# Patient Record
Sex: FEMALE | Race: WHITE | NOT HISPANIC OR LATINO | Employment: OTHER | URBAN - METROPOLITAN AREA
[De-identification: names, ages, dates, MRNs, and addresses within clinical notes are randomized per-mention and may not be internally consistent; named-entity substitution may affect disease eponyms.]

---

## 2017-04-20 ENCOUNTER — NEW REFERRAL (OUTPATIENT)
Dept: URBAN - METROPOLITAN AREA CLINIC 26 | Facility: CLINIC | Age: 82
End: 2017-04-20

## 2017-04-20 VITALS
HEART RATE: 70 BPM | HEIGHT: 67 IN | SYSTOLIC BLOOD PRESSURE: 141 MMHG | WEIGHT: 160 LBS | BODY MASS INDEX: 25.11 KG/M2 | DIASTOLIC BLOOD PRESSURE: 75 MMHG

## 2017-04-20 DIAGNOSIS — H02.833: ICD-10-CM

## 2017-04-20 DIAGNOSIS — H35.3132: ICD-10-CM

## 2017-04-20 DIAGNOSIS — H40.023: ICD-10-CM

## 2017-04-20 DIAGNOSIS — H35.371: ICD-10-CM

## 2017-04-20 DIAGNOSIS — H43.813: ICD-10-CM

## 2017-04-20 DIAGNOSIS — H02.836: ICD-10-CM

## 2017-04-20 DIAGNOSIS — H04.123: ICD-10-CM

## 2017-04-20 PROCEDURE — 99204 OFFICE O/P NEW MOD 45 MIN: CPT

## 2017-04-20 PROCEDURE — 92225 OPHTHALMOSCOPY (INITIAL): CPT

## 2017-04-20 PROCEDURE — 92134 CPTRZ OPH DX IMG PST SGM RTA: CPT

## 2017-04-20 ASSESSMENT — VISUAL ACUITY
OS_PH: 20/50+2
OD_PH: 20/40
OD_SC: 20/100-1
OS_SC: 20/50-1

## 2017-04-20 ASSESSMENT — TONOMETRY
OS_IOP_MMHG: 15
OD_IOP_MMHG: 12

## 2017-05-18 ENCOUNTER — FOLLOW UP (OUTPATIENT)
Dept: URBAN - METROPOLITAN AREA CLINIC 26 | Facility: CLINIC | Age: 82
End: 2017-05-18

## 2017-05-18 VITALS — DIASTOLIC BLOOD PRESSURE: 62 MMHG | SYSTOLIC BLOOD PRESSURE: 122 MMHG | HEIGHT: 55 IN | HEART RATE: 60 BPM

## 2017-05-18 DIAGNOSIS — H35.3132: ICD-10-CM

## 2017-05-18 DIAGNOSIS — H35.371: ICD-10-CM

## 2017-05-18 DIAGNOSIS — H40.023: ICD-10-CM

## 2017-05-18 DIAGNOSIS — H43.813: ICD-10-CM

## 2017-05-18 PROCEDURE — 92235 FLUORESCEIN ANGRPH MLTIFRAME: CPT

## 2017-05-18 PROCEDURE — 92250 FUNDUS PHOTOGRAPHY W/I&R: CPT

## 2017-05-18 PROCEDURE — 92014 COMPRE OPH EXAM EST PT 1/>: CPT

## 2017-05-18 ASSESSMENT — VISUAL ACUITY
OS_PH: 20/30
OS_CC: 20/40+2
OD_CC: 20/30-2

## 2017-05-18 ASSESSMENT — TONOMETRY
OD_IOP_MMHG: 11
OS_IOP_MMHG: 11

## 2017-12-26 ENCOUNTER — APPOINTMENT (RX ONLY)
Dept: URBAN - METROPOLITAN AREA CLINIC 116 | Facility: CLINIC | Age: 82
Setting detail: DERMATOLOGY
End: 2017-12-26

## 2017-12-26 DIAGNOSIS — L82.1 OTHER SEBORRHEIC KERATOSIS: ICD-10-CM

## 2017-12-26 DIAGNOSIS — L72.0 EPIDERMAL CYST: ICD-10-CM

## 2017-12-26 DIAGNOSIS — D485 NEOPLASM OF UNCERTAIN BEHAVIOR OF SKIN: ICD-10-CM

## 2017-12-26 DIAGNOSIS — D69.2 OTHER NONTHROMBOCYTOPENIC PURPURA: ICD-10-CM

## 2017-12-26 DIAGNOSIS — L81.4 OTHER MELANIN HYPERPIGMENTATION: ICD-10-CM

## 2017-12-26 DIAGNOSIS — D18.0 HEMANGIOMA: ICD-10-CM

## 2017-12-26 PROBLEM — Z85.3 PERSONAL HISTORY OF MALIGNANT NEOPLASM OF BREAST: Status: ACTIVE | Noted: 2017-12-26

## 2017-12-26 PROBLEM — D48.5 NEOPLASM OF UNCERTAIN BEHAVIOR OF SKIN: Status: ACTIVE | Noted: 2017-12-26

## 2017-12-26 PROBLEM — I63.50 CEREBRAL INFARCTION DUE TO UNSPECIFIED OCCLUSION OR STENOSIS OF UNSPECIFIED CEREBRAL ARTERY: Status: ACTIVE | Noted: 2017-12-26

## 2017-12-26 PROBLEM — M12.9 ARTHROPATHY, UNSPECIFIED: Status: ACTIVE | Noted: 2017-12-26

## 2017-12-26 PROBLEM — D18.01 HEMANGIOMA OF SKIN AND SUBCUTANEOUS TISSUE: Status: ACTIVE | Noted: 2017-12-26

## 2017-12-26 PROBLEM — J44.9 CHRONIC OBSTRUCTIVE PULMONARY DISEASE, UNSPECIFIED: Status: ACTIVE | Noted: 2017-12-26

## 2017-12-26 PROCEDURE — ? COUNSELING

## 2017-12-26 PROCEDURE — 11100: CPT

## 2017-12-26 PROCEDURE — 11101: CPT

## 2017-12-26 PROCEDURE — 99243 OFF/OP CNSLTJ NEW/EST LOW 30: CPT | Mod: 25

## 2017-12-26 PROCEDURE — ? BIOPSY BY SHAVE METHOD

## 2017-12-26 ASSESSMENT — LOCATION SIMPLE DESCRIPTION DERM
LOCATION SIMPLE: LEFT UPPER BACK
LOCATION SIMPLE: RIGHT CHEEK
LOCATION SIMPLE: LEFT PRETIBIAL REGION
LOCATION SIMPLE: ABDOMEN
LOCATION SIMPLE: LEFT THIGH
LOCATION SIMPLE: ABDOMEN
LOCATION SIMPLE: RIGHT CALF
LOCATION SIMPLE: RIGHT UPPER BACK
LOCATION SIMPLE: RIGHT PRETIBIAL REGION
LOCATION SIMPLE: LEFT FOREARM
LOCATION SIMPLE: RIGHT LOWER BACK
LOCATION SIMPLE: RIGHT INFERIOR EYELID

## 2017-12-26 ASSESSMENT — LOCATION ZONE DERM
LOCATION ZONE: EYELID
LOCATION ZONE: ARM
LOCATION ZONE: TRUNK
LOCATION ZONE: LEG
LOCATION ZONE: TRUNK
LOCATION ZONE: FACE

## 2017-12-26 ASSESSMENT — LOCATION DETAILED DESCRIPTION DERM
LOCATION DETAILED: RIGHT INFERIOR CENTRAL MALAR CHEEK
LOCATION DETAILED: PERIUMBILICAL SKIN
LOCATION DETAILED: LEFT DISTAL PRETIBIAL REGION
LOCATION DETAILED: PERIUMBILICAL SKIN
LOCATION DETAILED: RIGHT SUPERIOR UPPER BACK
LOCATION DETAILED: LEFT PROXIMAL DORSAL FOREARM
LOCATION DETAILED: RIGHT SUPERIOR MEDIAL MIDBACK
LOCATION DETAILED: RIGHT PROXIMAL CALF
LOCATION DETAILED: LEFT SUPERIOR UPPER BACK
LOCATION DETAILED: RIGHT DISTAL PRETIBIAL REGION
LOCATION DETAILED: RIGHT CENTRAL MALAR CHEEK
LOCATION DETAILED: LEFT ANTERIOR PROXIMAL THIGH
LOCATION DETAILED: LEFT PROXIMAL PRETIBIAL REGION
LOCATION DETAILED: RIGHT LATERAL INFERIOR EYELID

## 2017-12-26 NOTE — PROCEDURE: BIOPSY BY SHAVE METHOD
Size Of Lesion In Cm: 0.8
Electrodesiccation And Curettage Text: The wound bed was treated with electrodesiccation and curettage after the biopsy was performed.
Post-Care Instructions: I reviewed with the patient in detail post-care instructions. Patient is to keep the biopsy site dry overnight, and then apply bacitracin twice daily until healed. Patient may apply hydrogen peroxide soaks to remove any crusting.
Render Post-Care Instructions In Note?: no
Dressing: bandage
X Size Of Lesion In Cm: 0
Notification Instructions: Patient will be notified of biopsy results. However, patient instructed to call the office if not contacted within 2 weeks.
Lab Facility: [id number]
Cryotherapy Text: The wound bed was treated with cryotherapy after the biopsy was performed.
Anesthesia Volume In Cc (Will Not Render If 0): 0.5
Type Of Destruction Used: Curettage
Anesthesia Type: 2% Xylocaine with epinephrine and sodium bicarbonate
Silver Nitrate Text: The wound bed was treated with silver nitrate after the biopsy was performed.
Electrodesiccation Text: The wound bed was treated with electrodesiccation after the biopsy was performed.
Hemostasis: Aluminum Chloride
Biopsy Method: Double edge Personna blades
Detail Level: Simple
Lab: [facility]
Consent: Written consent was obtained and risks were reviewed including but not limited to scarring, infection, bleeding, scabbing, incomplete removal, nerve damage and allergy to anesthesia.
Billing Type: [employer]
Biopsy Type: H and E
Wound Care: Polysporin ointment
Anesthesia Type: 1% lidocaine with epinephrine and a 1:10 solution of 8.4% sodium bicarbonate
Lab: [id number]
Lab Facility: [id number]
Billing Type: Third-Party Bill
Size Of Lesion In Cm: 1.7

## 2018-01-16 ENCOUNTER — APPOINTMENT (RX ONLY)
Dept: URBAN - METROPOLITAN AREA CLINIC 116 | Facility: CLINIC | Age: 83
Setting detail: DERMATOLOGY
End: 2018-01-16

## 2018-01-16 DIAGNOSIS — L57.0 ACTINIC KERATOSIS: ICD-10-CM

## 2018-01-16 PROCEDURE — ? LIQUID NITROGEN

## 2018-01-16 PROCEDURE — 17003 DESTRUCT PREMALG LES 2-14: CPT

## 2018-01-16 PROCEDURE — ? DIAGNOSIS COMMENT

## 2018-01-16 PROCEDURE — 17000 DESTRUCT PREMALG LESION: CPT

## 2018-01-16 ASSESSMENT — LOCATION DETAILED DESCRIPTION DERM
LOCATION DETAILED: LEFT DISTAL DORSAL FOREARM
LOCATION DETAILED: LEFT PROXIMAL DORSAL FOREARM

## 2018-01-16 ASSESSMENT — LOCATION ZONE DERM: LOCATION ZONE: ARM

## 2018-01-16 ASSESSMENT — LOCATION SIMPLE DESCRIPTION DERM: LOCATION SIMPLE: LEFT FOREARM

## 2018-05-17 ENCOUNTER — FOLLOW UP (OUTPATIENT)
Dept: URBAN - METROPOLITAN AREA CLINIC 26 | Facility: CLINIC | Age: 83
End: 2018-05-17

## 2018-05-17 VITALS — WEIGHT: 165 LBS | HEIGHT: 67 IN | BODY MASS INDEX: 25.9 KG/M2

## 2018-05-17 DIAGNOSIS — H35.371: ICD-10-CM

## 2018-05-17 DIAGNOSIS — H40.023: ICD-10-CM

## 2018-05-17 DIAGNOSIS — H35.3132: ICD-10-CM

## 2018-05-17 DIAGNOSIS — H04.123: ICD-10-CM

## 2018-05-17 DIAGNOSIS — H02.833: ICD-10-CM

## 2018-05-17 DIAGNOSIS — H43.813: ICD-10-CM

## 2018-05-17 DIAGNOSIS — H02.836: ICD-10-CM

## 2018-05-17 PROCEDURE — 92250 FUNDUS PHOTOGRAPHY W/I&R: CPT

## 2018-05-17 PROCEDURE — 92134 CPTRZ OPH DX IMG PST SGM RTA: CPT

## 2018-05-17 PROCEDURE — 92014 COMPRE OPH EXAM EST PT 1/>: CPT

## 2018-05-17 ASSESSMENT — VISUAL ACUITY
OS_CC: 20/25-2
OD_CC: 20/30-2

## 2018-05-17 ASSESSMENT — TONOMETRY
OS_IOP_MMHG: 11
OD_IOP_MMHG: 15

## 2019-05-01 ENCOUNTER — FOLLOW UP (OUTPATIENT)
Dept: URBAN - METROPOLITAN AREA CLINIC 26 | Facility: CLINIC | Age: 84
End: 2019-05-01

## 2019-05-01 VITALS
BODY MASS INDEX: 26.52 KG/M2 | WEIGHT: 165 LBS | HEIGHT: 66 IN | DIASTOLIC BLOOD PRESSURE: 64 MMHG | HEART RATE: 70 BPM | SYSTOLIC BLOOD PRESSURE: 129 MMHG

## 2019-05-01 DIAGNOSIS — H35.371: ICD-10-CM

## 2019-05-01 DIAGNOSIS — H35.3112: ICD-10-CM

## 2019-05-01 DIAGNOSIS — H02.836: ICD-10-CM

## 2019-05-01 DIAGNOSIS — H35.3221: ICD-10-CM

## 2019-05-01 DIAGNOSIS — H04.123: ICD-10-CM

## 2019-05-01 DIAGNOSIS — H43.813: ICD-10-CM

## 2019-05-01 DIAGNOSIS — H40.023: ICD-10-CM

## 2019-05-01 DIAGNOSIS — H02.833: ICD-10-CM

## 2019-05-01 PROCEDURE — 92014 COMPRE OPH EXAM EST PT 1/>: CPT

## 2019-05-01 PROCEDURE — 67028 INJECTION EYE DRUG: CPT

## 2019-05-01 PROCEDURE — 92250 FUNDUS PHOTOGRAPHY W/I&R: CPT

## 2019-05-01 PROCEDURE — 92235 FLUORESCEIN ANGRPH MLTIFRAME: CPT

## 2019-05-01 ASSESSMENT — TONOMETRY
OD_IOP_MMHG: 11
OS_IOP_MMHG: 13

## 2019-05-01 ASSESSMENT — VISUAL ACUITY
OD_SC: 20/30
OS_SC: 20/50
OS_PH: 20/30+1

## 2019-06-05 ENCOUNTER — CLINICAL PROCEDURE AND DIAGNOSTIC TESTING ONLY (OUTPATIENT)
Dept: URBAN - METROPOLITAN AREA CLINIC 26 | Facility: CLINIC | Age: 84
End: 2019-06-05

## 2019-06-05 DIAGNOSIS — H35.3221: ICD-10-CM

## 2019-06-05 DIAGNOSIS — H35.3112: ICD-10-CM

## 2019-06-05 PROCEDURE — 92250 FUNDUS PHOTOGRAPHY W/I&R: CPT

## 2019-06-05 PROCEDURE — 67028 INJECTION EYE DRUG: CPT

## 2019-06-05 ASSESSMENT — TONOMETRY: OS_IOP_MMHG: 12

## 2019-06-05 ASSESSMENT — VISUAL ACUITY: OS_SC: 20/50-2

## 2019-07-09 ENCOUNTER — CLINICAL PROCEDURE AND DIAGNOSTIC TESTING ONLY (OUTPATIENT)
Dept: URBAN - METROPOLITAN AREA CLINIC 26 | Facility: CLINIC | Age: 84
End: 2019-07-09

## 2019-07-09 DIAGNOSIS — H35.3221: ICD-10-CM

## 2019-07-09 DIAGNOSIS — H35.3112: ICD-10-CM

## 2019-07-09 PROCEDURE — 67028 INJECTION EYE DRUG: CPT

## 2019-07-09 PROCEDURE — 92134 CPTRZ OPH DX IMG PST SGM RTA: CPT

## 2019-07-09 ASSESSMENT — VISUAL ACUITY: OS_SC: 20/60+1

## 2019-07-09 ASSESSMENT — TONOMETRY: OS_IOP_MMHG: 9

## 2019-08-13 ENCOUNTER — FOLLOW UP AND POST INJECTION EVALUATION (OUTPATIENT)
Dept: URBAN - METROPOLITAN AREA CLINIC 26 | Facility: CLINIC | Age: 84
End: 2019-08-13

## 2019-08-13 DIAGNOSIS — H35.371: ICD-10-CM

## 2019-08-13 DIAGNOSIS — H43.813: ICD-10-CM

## 2019-08-13 DIAGNOSIS — H35.3112: ICD-10-CM

## 2019-08-13 DIAGNOSIS — H35.3221: ICD-10-CM

## 2019-08-13 DIAGNOSIS — H40.023: ICD-10-CM

## 2019-08-13 PROCEDURE — 67028 INJECTION EYE DRUG: CPT

## 2019-08-13 PROCEDURE — 92250 FUNDUS PHOTOGRAPHY W/I&R: CPT

## 2019-08-13 PROCEDURE — 92014 COMPRE OPH EXAM EST PT 1/>: CPT

## 2019-08-13 PROCEDURE — 92134 CPTRZ OPH DX IMG PST SGM RTA: CPT

## 2019-08-13 ASSESSMENT — VISUAL ACUITY
OD_CC: 20/25-2
OS_CC: 20/30+2

## 2019-08-13 ASSESSMENT — TONOMETRY
OS_IOP_MMHG: 13
OD_IOP_MMHG: 12

## 2019-09-24 ENCOUNTER — CLINICAL PROCEDURE AND DIAGNOSTIC TESTING ONLY (OUTPATIENT)
Dept: URBAN - METROPOLITAN AREA CLINIC 26 | Facility: CLINIC | Age: 84
End: 2019-09-24

## 2019-09-24 DIAGNOSIS — H35.3221: ICD-10-CM

## 2019-09-24 DIAGNOSIS — H35.3112: ICD-10-CM

## 2019-09-24 PROCEDURE — 67028 INJECTION EYE DRUG: CPT

## 2019-09-24 PROCEDURE — 92134 CPTRZ OPH DX IMG PST SGM RTA: CPT

## 2019-09-24 PROCEDURE — 92250 FUNDUS PHOTOGRAPHY W/I&R: CPT

## 2019-09-24 ASSESSMENT — TONOMETRY: OS_IOP_MMHG: 14

## 2019-09-24 ASSESSMENT — VISUAL ACUITY: OS_SC: 20/60-2

## 2019-11-12 ENCOUNTER — CLINICAL PROCEDURE AND DIAGNOSTIC TESTING ONLY (OUTPATIENT)
Dept: URBAN - METROPOLITAN AREA CLINIC 26 | Facility: CLINIC | Age: 84
End: 2019-11-12

## 2019-11-12 DIAGNOSIS — H35.3221: ICD-10-CM

## 2019-11-12 DIAGNOSIS — H35.3112: ICD-10-CM

## 2019-11-12 PROCEDURE — 67028 INJECTION EYE DRUG: CPT

## 2019-11-12 PROCEDURE — 92134 CPTRZ OPH DX IMG PST SGM RTA: CPT

## 2019-11-12 PROCEDURE — 92250 FUNDUS PHOTOGRAPHY W/I&R: CPT

## 2019-11-12 ASSESSMENT — TONOMETRY: OS_IOP_MMHG: 13

## 2019-11-12 ASSESSMENT — VISUAL ACUITY: OS_CC: 20/30-1

## 2020-01-07 ENCOUNTER — FOLLOW UP AND POST INJECTION EVALUATION (OUTPATIENT)
Dept: URBAN - METROPOLITAN AREA CLINIC 26 | Facility: CLINIC | Age: 85
End: 2020-01-07

## 2020-01-07 VITALS — BODY MASS INDEX: 26.52 KG/M2 | WEIGHT: 165 LBS | HEIGHT: 66 IN

## 2020-01-07 DIAGNOSIS — H35.3112: ICD-10-CM

## 2020-01-07 DIAGNOSIS — H02.833: ICD-10-CM

## 2020-01-07 DIAGNOSIS — H35.371: ICD-10-CM

## 2020-01-07 DIAGNOSIS — H35.3221: ICD-10-CM

## 2020-01-07 DIAGNOSIS — H43.813: ICD-10-CM

## 2020-01-07 DIAGNOSIS — H04.123: ICD-10-CM

## 2020-01-07 DIAGNOSIS — H40.023: ICD-10-CM

## 2020-01-07 DIAGNOSIS — H02.836: ICD-10-CM

## 2020-01-07 PROCEDURE — 67028 INJECTION EYE DRUG: CPT

## 2020-01-07 PROCEDURE — 92250 FUNDUS PHOTOGRAPHY W/I&R: CPT

## 2020-01-07 PROCEDURE — 92014 COMPRE OPH EXAM EST PT 1/>: CPT

## 2020-01-07 ASSESSMENT — TONOMETRY
OD_IOP_MMHG: 14
OS_IOP_MMHG: 14

## 2020-01-07 ASSESSMENT — VISUAL ACUITY
OD_CC: 20/20-2
OS_CC: 20/25+2

## 2020-03-10 ENCOUNTER — CLINICAL PROCEDURE AND DIAGNOSTIC TESTING ONLY (OUTPATIENT)
Dept: URBAN - METROPOLITAN AREA CLINIC 26 | Facility: CLINIC | Age: 85
End: 2020-03-10

## 2020-03-10 DIAGNOSIS — H35.3112: ICD-10-CM

## 2020-03-10 DIAGNOSIS — H35.3221: ICD-10-CM

## 2020-03-10 PROCEDURE — 67028 INJECTION EYE DRUG: CPT

## 2020-03-10 PROCEDURE — 92134 CPTRZ OPH DX IMG PST SGM RTA: CPT

## 2020-03-10 PROCEDURE — 92250 FUNDUS PHOTOGRAPHY W/I&R: CPT

## 2020-03-10 ASSESSMENT — TONOMETRY: OS_IOP_MMHG: 15

## 2020-03-10 ASSESSMENT — VISUAL ACUITY
OS_CC: 20/30+1
OD_CC: 20/25+1

## 2020-05-19 ENCOUNTER — CLINICAL PROCEDURE AND DIAGNOSTIC TESTING ONLY (OUTPATIENT)
Dept: URBAN - METROPOLITAN AREA CLINIC 26 | Facility: CLINIC | Age: 85
End: 2020-05-19

## 2020-05-19 DIAGNOSIS — H35.3112: ICD-10-CM

## 2020-05-19 DIAGNOSIS — H35.371: ICD-10-CM

## 2020-05-19 DIAGNOSIS — H35.3221: ICD-10-CM

## 2020-05-19 PROCEDURE — 67028 INJECTION EYE DRUG: CPT

## 2020-05-19 PROCEDURE — 92134 CPTRZ OPH DX IMG PST SGM RTA: CPT

## 2020-05-19 PROCEDURE — 92250 FUNDUS PHOTOGRAPHY W/I&R: CPT

## 2020-05-19 ASSESSMENT — TONOMETRY: OS_IOP_MMHG: 14

## 2020-05-19 ASSESSMENT — VISUAL ACUITY: OS_CC: 20/25

## 2020-07-28 ENCOUNTER — FOLLOW UP AND POST INJECTION EVALUATION (OUTPATIENT)
Dept: URBAN - METROPOLITAN AREA CLINIC 26 | Facility: CLINIC | Age: 85
End: 2020-07-28

## 2020-07-28 DIAGNOSIS — H35.371: ICD-10-CM

## 2020-07-28 DIAGNOSIS — H40.023: ICD-10-CM

## 2020-07-28 DIAGNOSIS — H35.3112: ICD-10-CM

## 2020-07-28 DIAGNOSIS — H35.3221: ICD-10-CM

## 2020-07-28 DIAGNOSIS — H43.813: ICD-10-CM

## 2020-07-28 PROCEDURE — 92134 CPTRZ OPH DX IMG PST SGM RTA: CPT

## 2020-07-28 PROCEDURE — 92014 COMPRE OPH EXAM EST PT 1/>: CPT

## 2020-07-28 PROCEDURE — 67028 INJECTION EYE DRUG: CPT

## 2020-07-28 PROCEDURE — 92250 FUNDUS PHOTOGRAPHY W/I&R: CPT

## 2020-07-28 ASSESSMENT — TONOMETRY
OS_IOP_MMHG: 13
OD_IOP_MMHG: 13

## 2020-07-28 ASSESSMENT — VISUAL ACUITY
OS_CC: 20/25-1
OD_CC: 20/30-1

## 2020-09-22 ENCOUNTER — CLINICAL PROCEDURE AND DIAGNOSTIC TESTING ONLY (OUTPATIENT)
Dept: URBAN - METROPOLITAN AREA CLINIC 26 | Facility: CLINIC | Age: 85
End: 2020-09-22

## 2020-09-22 DIAGNOSIS — H35.3112: ICD-10-CM

## 2020-09-22 DIAGNOSIS — H35.3221: ICD-10-CM

## 2020-09-22 PROCEDURE — 92250 FUNDUS PHOTOGRAPHY W/I&R: CPT

## 2020-09-22 PROCEDURE — 67028 INJECTION EYE DRUG: CPT

## 2020-09-22 ASSESSMENT — TONOMETRY: OS_IOP_MMHG: 14

## 2020-09-22 ASSESSMENT — VISUAL ACUITY: OS_CC: 20/30-2

## 2020-11-23 ENCOUNTER — CLINICAL PROCEDURE AND DIAGNOSTIC TESTING ONLY (OUTPATIENT)
Dept: URBAN - METROPOLITAN AREA CLINIC 26 | Facility: CLINIC | Age: 85
End: 2020-11-23

## 2020-11-23 DIAGNOSIS — H35.3112: ICD-10-CM

## 2020-11-23 DIAGNOSIS — H35.3221: ICD-10-CM

## 2020-11-23 PROCEDURE — 92134 CPTRZ OPH DX IMG PST SGM RTA: CPT

## 2020-11-23 PROCEDURE — 92250 FUNDUS PHOTOGRAPHY W/I&R: CPT

## 2020-11-23 PROCEDURE — 67028 INJECTION EYE DRUG: CPT

## 2020-11-23 RX ORDER — LATANOPROST 0.05 MG/ML: 1 SOLUTION/ DROPS OPHTHALMIC; TOPICAL EVERY EVENING

## 2020-11-23 ASSESSMENT — VISUAL ACUITY: OS_CC: 20/30-1

## 2020-11-23 ASSESSMENT — TONOMETRY: OS_IOP_MMHG: 13

## 2021-01-19 ENCOUNTER — FOLLOW UP AND POST INJECTION EVALUATION (OUTPATIENT)
Dept: URBAN - METROPOLITAN AREA CLINIC 26 | Facility: CLINIC | Age: 86
End: 2021-01-19

## 2021-01-19 VITALS — BODY MASS INDEX: 26.52 KG/M2 | WEIGHT: 165 LBS | HEIGHT: 66 IN

## 2021-01-19 DIAGNOSIS — H40.023: ICD-10-CM

## 2021-01-19 DIAGNOSIS — H35.3112: ICD-10-CM

## 2021-01-19 DIAGNOSIS — H35.371: ICD-10-CM

## 2021-01-19 DIAGNOSIS — H43.813: ICD-10-CM

## 2021-01-19 DIAGNOSIS — H35.3221: ICD-10-CM

## 2021-01-19 PROCEDURE — 67028 INJECTION EYE DRUG: CPT

## 2021-01-19 PROCEDURE — 92134 CPTRZ OPH DX IMG PST SGM RTA: CPT

## 2021-01-19 PROCEDURE — 92014 COMPRE OPH EXAM EST PT 1/>: CPT

## 2021-01-19 PROCEDURE — 92250 FUNDUS PHOTOGRAPHY W/I&R: CPT

## 2021-01-19 ASSESSMENT — VISUAL ACUITY
OD_PH: 20/30-2
OD_CC: 20/40-2
OS_CC: 20/30-2

## 2021-01-19 ASSESSMENT — TONOMETRY
OS_IOP_MMHG: 10
OD_IOP_MMHG: 11

## 2021-03-16 ENCOUNTER — CLINICAL PROCEDURE AND DIAGNOSTIC TESTING ONLY (OUTPATIENT)
Dept: URBAN - METROPOLITAN AREA CLINIC 26 | Facility: CLINIC | Age: 86
End: 2021-03-16

## 2021-03-16 DIAGNOSIS — H35.3112: ICD-10-CM

## 2021-03-16 DIAGNOSIS — H35.3221: ICD-10-CM

## 2021-03-16 DIAGNOSIS — H35.371: ICD-10-CM

## 2021-03-16 PROCEDURE — 67028 INJECTION EYE DRUG: CPT

## 2021-03-16 PROCEDURE — 92250 FUNDUS PHOTOGRAPHY W/I&R: CPT

## 2021-03-16 PROCEDURE — 92134 CPTRZ OPH DX IMG PST SGM RTA: CPT

## 2021-03-16 ASSESSMENT — VISUAL ACUITY: OS_CC: 20/30-1

## 2021-03-16 ASSESSMENT — TONOMETRY: OS_IOP_MMHG: 11

## 2021-05-11 ENCOUNTER — CLINICAL PROCEDURE AND DIAGNOSTIC TESTING ONLY (OUTPATIENT)
Dept: URBAN - METROPOLITAN AREA CLINIC 26 | Facility: CLINIC | Age: 86
End: 2021-05-11

## 2021-05-11 DIAGNOSIS — H35.3221: ICD-10-CM

## 2021-05-11 DIAGNOSIS — H35.371: ICD-10-CM

## 2021-05-11 DIAGNOSIS — H35.3112: ICD-10-CM

## 2021-05-11 PROCEDURE — 92134 CPTRZ OPH DX IMG PST SGM RTA: CPT

## 2021-05-11 PROCEDURE — 67028 INJECTION EYE DRUG: CPT

## 2021-05-11 PROCEDURE — 92250 FUNDUS PHOTOGRAPHY W/I&R: CPT

## 2021-05-11 ASSESSMENT — TONOMETRY: OS_IOP_MMHG: 15

## 2021-05-11 ASSESSMENT — VISUAL ACUITY: OS_CC: 20/30

## 2021-07-20 ENCOUNTER — FOLLOW UP (OUTPATIENT)
Dept: URBAN - METROPOLITAN AREA CLINIC 26 | Facility: CLINIC | Age: 86
End: 2021-07-20

## 2021-07-20 DIAGNOSIS — H35.3112: ICD-10-CM

## 2021-07-20 DIAGNOSIS — H35.371: ICD-10-CM

## 2021-07-20 DIAGNOSIS — H40.023: ICD-10-CM

## 2021-07-20 DIAGNOSIS — H43.813: ICD-10-CM

## 2021-07-20 DIAGNOSIS — H35.3221: ICD-10-CM

## 2021-07-20 PROCEDURE — 92250 FUNDUS PHOTOGRAPHY W/I&R: CPT

## 2021-07-20 PROCEDURE — 67028 INJECTION EYE DRUG: CPT

## 2021-07-20 PROCEDURE — 92014 COMPRE OPH EXAM EST PT 1/>: CPT

## 2021-07-20 PROCEDURE — 92134 CPTRZ OPH DX IMG PST SGM RTA: CPT

## 2021-07-20 ASSESSMENT — VISUAL ACUITY
OD_SC: 20/25-2
OS_SC: 20/25-2

## 2021-07-20 ASSESSMENT — TONOMETRY
OD_IOP_MMHG: 11
OS_IOP_MMHG: 10

## 2021-09-14 ENCOUNTER — CLINICAL PROCEDURE AND DIAGNOSTIC TESTING ONLY (OUTPATIENT)
Dept: URBAN - METROPOLITAN AREA CLINIC 26 | Facility: CLINIC | Age: 86
End: 2021-09-14

## 2021-09-14 DIAGNOSIS — H35.3221: ICD-10-CM

## 2021-09-14 DIAGNOSIS — H35.371: ICD-10-CM

## 2021-09-14 DIAGNOSIS — H35.3112: ICD-10-CM

## 2021-09-14 PROCEDURE — 92250 FUNDUS PHOTOGRAPHY W/I&R: CPT

## 2021-09-14 PROCEDURE — 92134 CPTRZ OPH DX IMG PST SGM RTA: CPT

## 2021-09-14 PROCEDURE — 67028 INJECTION EYE DRUG: CPT

## 2021-09-14 ASSESSMENT — VISUAL ACUITY: OS_CC: 20/30-2

## 2021-09-14 ASSESSMENT — TONOMETRY: OS_IOP_MMHG: 13

## 2021-11-09 ENCOUNTER — CLINICAL PROCEDURE AND DIAGNOSTIC TESTING ONLY (OUTPATIENT)
Dept: URBAN - METROPOLITAN AREA CLINIC 26 | Facility: CLINIC | Age: 86
End: 2021-11-09

## 2021-11-09 DIAGNOSIS — H35.3112: ICD-10-CM

## 2021-11-09 DIAGNOSIS — H35.371: ICD-10-CM

## 2021-11-09 DIAGNOSIS — H35.3221: ICD-10-CM

## 2021-11-09 PROCEDURE — 92134 CPTRZ OPH DX IMG PST SGM RTA: CPT

## 2021-11-09 PROCEDURE — 67028 INJECTION EYE DRUG: CPT

## 2021-11-09 PROCEDURE — 92250 FUNDUS PHOTOGRAPHY W/I&R: CPT

## 2021-11-09 ASSESSMENT — TONOMETRY: OS_IOP_MMHG: 09

## 2021-11-09 ASSESSMENT — VISUAL ACUITY: OS_CC: 20/30+1

## 2022-01-04 ENCOUNTER — FOLLOW UP (OUTPATIENT)
Dept: URBAN - METROPOLITAN AREA CLINIC 26 | Facility: CLINIC | Age: 87
End: 2022-01-04

## 2022-01-04 VITALS — BODY MASS INDEX: 29.44 KG/M2 | HEIGHT: 62 IN | WEIGHT: 160 LBS

## 2022-01-04 DIAGNOSIS — H35.3221: ICD-10-CM

## 2022-01-04 DIAGNOSIS — H35.3112: ICD-10-CM

## 2022-01-04 DIAGNOSIS — H43.813: ICD-10-CM

## 2022-01-04 DIAGNOSIS — H04.123: ICD-10-CM

## 2022-01-04 DIAGNOSIS — H35.371: ICD-10-CM

## 2022-01-04 DIAGNOSIS — H40.023: ICD-10-CM

## 2022-01-04 PROCEDURE — 92250 FUNDUS PHOTOGRAPHY W/I&R: CPT

## 2022-01-04 PROCEDURE — 92134 CPTRZ OPH DX IMG PST SGM RTA: CPT

## 2022-01-04 PROCEDURE — 67028 INJECTION EYE DRUG: CPT

## 2022-01-04 PROCEDURE — 92014 COMPRE OPH EXAM EST PT 1/>: CPT

## 2022-01-04 ASSESSMENT — TONOMETRY
OS_IOP_MMHG: 12
OD_IOP_MMHG: 13

## 2022-01-04 ASSESSMENT — VISUAL ACUITY
OD_CC: 20/30+2
OS_CC: 20/25-1

## 2022-03-01 ENCOUNTER — CLINIC PROCEDURE ONLY (OUTPATIENT)
Dept: URBAN - METROPOLITAN AREA CLINIC 26 | Facility: CLINIC | Age: 87
End: 2022-03-01

## 2022-03-01 DIAGNOSIS — H35.371: ICD-10-CM

## 2022-03-01 DIAGNOSIS — H35.3112: ICD-10-CM

## 2022-03-01 DIAGNOSIS — H35.3221: ICD-10-CM

## 2022-03-01 PROCEDURE — 92134 CPTRZ OPH DX IMG PST SGM RTA: CPT

## 2022-03-01 PROCEDURE — 92250 FUNDUS PHOTOGRAPHY W/I&R: CPT

## 2022-03-01 PROCEDURE — 67028 INJECTION EYE DRUG: CPT

## 2022-03-01 ASSESSMENT — TONOMETRY: OS_IOP_MMHG: 17

## 2022-04-26 ENCOUNTER — CLINIC PROCEDURE ONLY (OUTPATIENT)
Dept: URBAN - METROPOLITAN AREA CLINIC 26 | Facility: CLINIC | Age: 87
End: 2022-04-26

## 2022-04-26 DIAGNOSIS — H35.371: ICD-10-CM

## 2022-04-26 DIAGNOSIS — H35.3112: ICD-10-CM

## 2022-04-26 DIAGNOSIS — H35.3221: ICD-10-CM

## 2022-04-26 PROCEDURE — 67028 INJECTION EYE DRUG: CPT

## 2022-04-26 PROCEDURE — 92134 CPTRZ OPH DX IMG PST SGM RTA: CPT

## 2022-04-26 PROCEDURE — 92250 FUNDUS PHOTOGRAPHY W/I&R: CPT

## 2022-04-26 ASSESSMENT — TONOMETRY
OS_IOP_MMHG: 13
OS_IOP_MMHG: 19

## 2022-06-07 ENCOUNTER — CLINIC PROCEDURE ONLY (OUTPATIENT)
Dept: URBAN - METROPOLITAN AREA CLINIC 26 | Facility: CLINIC | Age: 87
End: 2022-06-07

## 2022-06-07 DIAGNOSIS — H35.3112: ICD-10-CM

## 2022-06-07 DIAGNOSIS — H35.3221: ICD-10-CM

## 2022-06-07 DIAGNOSIS — H35.371: ICD-10-CM

## 2022-06-07 PROCEDURE — 92134 CPTRZ OPH DX IMG PST SGM RTA: CPT

## 2022-06-07 PROCEDURE — 92250 FUNDUS PHOTOGRAPHY W/I&R: CPT

## 2022-06-07 PROCEDURE — 67028 INJECTION EYE DRUG: CPT

## 2022-06-07 ASSESSMENT — TONOMETRY: OS_IOP_MMHG: 15

## 2022-07-09 ENCOUNTER — TELEPHONE ENCOUNTER (OUTPATIENT)
Dept: URBAN - METROPOLITAN AREA CLINIC 121 | Facility: CLINIC | Age: 87
End: 2022-07-09

## 2022-07-10 ENCOUNTER — TELEPHONE ENCOUNTER (OUTPATIENT)
Dept: URBAN - METROPOLITAN AREA CLINIC 121 | Facility: CLINIC | Age: 87
End: 2022-07-10

## 2022-07-10 RX ORDER — ANASTROZOLE 1 MG/1
TABLET ORAL ONCE A DAY
Refills: 0 | Status: ACTIVE | COMMUNITY
Start: 2008-11-14

## 2022-08-02 ENCOUNTER — FOLLOW UP (OUTPATIENT)
Dept: URBAN - METROPOLITAN AREA CLINIC 26 | Facility: CLINIC | Age: 87
End: 2022-08-02

## 2022-08-02 DIAGNOSIS — H43.813: ICD-10-CM

## 2022-08-02 DIAGNOSIS — H35.3221: ICD-10-CM

## 2022-08-02 DIAGNOSIS — H35.371: ICD-10-CM

## 2022-08-02 DIAGNOSIS — H35.3133: ICD-10-CM

## 2022-08-02 DIAGNOSIS — H40.023: ICD-10-CM

## 2022-08-02 DIAGNOSIS — H04.123: ICD-10-CM

## 2022-08-02 PROCEDURE — 92014 COMPRE OPH EXAM EST PT 1/>: CPT

## 2022-08-02 PROCEDURE — 92134 CPTRZ OPH DX IMG PST SGM RTA: CPT

## 2022-08-02 PROCEDURE — 92250 FUNDUS PHOTOGRAPHY W/I&R: CPT

## 2022-08-02 PROCEDURE — 67028 INJECTION EYE DRUG: CPT

## 2022-08-02 ASSESSMENT — TONOMETRY
OS_IOP_MMHG: 15
OD_IOP_MMHG: 16

## 2022-08-02 ASSESSMENT — VISUAL ACUITY
OS_SC: 20/50-2
OD_SC: 20/25
OS_PH: 20/40-1

## 2022-10-04 ENCOUNTER — CLINIC PROCEDURE ONLY (OUTPATIENT)
Dept: URBAN - METROPOLITAN AREA CLINIC 26 | Facility: CLINIC | Age: 87
End: 2022-10-04

## 2022-10-04 DIAGNOSIS — H35.3221: ICD-10-CM

## 2022-10-04 DIAGNOSIS — H35.3133: ICD-10-CM

## 2022-10-04 DIAGNOSIS — H35.371: ICD-10-CM

## 2022-10-04 PROCEDURE — 92250 FUNDUS PHOTOGRAPHY W/I&R: CPT

## 2022-10-04 PROCEDURE — 92134 CPTRZ OPH DX IMG PST SGM RTA: CPT

## 2022-10-04 PROCEDURE — 67028 INJECTION EYE DRUG: CPT

## 2022-10-04 ASSESSMENT — TONOMETRY: OS_IOP_MMHG: 13

## 2022-11-29 ENCOUNTER — CLINIC PROCEDURE ONLY (OUTPATIENT)
Dept: URBAN - METROPOLITAN AREA CLINIC 26 | Facility: CLINIC | Age: 87
End: 2022-11-29

## 2022-11-29 DIAGNOSIS — H35.3221: ICD-10-CM

## 2022-11-29 DIAGNOSIS — H35.371: ICD-10-CM

## 2022-11-29 DIAGNOSIS — H35.3133: ICD-10-CM

## 2022-11-29 PROCEDURE — 92134 CPTRZ OPH DX IMG PST SGM RTA: CPT

## 2022-11-29 PROCEDURE — 92250 FUNDUS PHOTOGRAPHY W/I&R: CPT

## 2022-11-29 PROCEDURE — 67028 INJECTION EYE DRUG: CPT

## 2022-11-29 ASSESSMENT — TONOMETRY: OS_IOP_MMHG: 12

## 2023-01-24 ENCOUNTER — CLINIC PROCEDURE ONLY (OUTPATIENT)
Dept: URBAN - METROPOLITAN AREA CLINIC 26 | Facility: CLINIC | Age: 88
End: 2023-01-24

## 2023-01-24 DIAGNOSIS — H35.371: ICD-10-CM

## 2023-01-24 DIAGNOSIS — H35.3133: ICD-10-CM

## 2023-01-24 DIAGNOSIS — H35.3221: ICD-10-CM

## 2023-01-24 PROCEDURE — 67028 INJECTION EYE DRUG: CPT

## 2023-01-24 PROCEDURE — 92134 CPTRZ OPH DX IMG PST SGM RTA: CPT

## 2023-01-24 PROCEDURE — 92250 FUNDUS PHOTOGRAPHY W/I&R: CPT

## 2023-01-24 ASSESSMENT — TONOMETRY: OS_IOP_MMHG: 11

## 2023-04-04 ENCOUNTER — CLINIC PROCEDURE ONLY (OUTPATIENT)
Dept: URBAN - METROPOLITAN AREA CLINIC 26 | Facility: CLINIC | Age: 88
End: 2023-04-04

## 2023-04-04 DIAGNOSIS — H35.3123: ICD-10-CM

## 2023-04-04 PROCEDURE — 67028 INJECTION EYE DRUG: CPT

## 2023-04-04 PROCEDURE — J3490SYF SYFOVRE

## 2023-04-04 ASSESSMENT — TONOMETRY: OS_IOP_MMHG: 13

## 2023-05-23 ENCOUNTER — CLINIC PROCEDURE ONLY (OUTPATIENT)
Dept: URBAN - METROPOLITAN AREA CLINIC 26 | Facility: CLINIC | Age: 88
End: 2023-05-23

## 2023-05-23 DIAGNOSIS — H35.371: ICD-10-CM

## 2023-05-23 DIAGNOSIS — H35.3112: ICD-10-CM

## 2023-05-23 DIAGNOSIS — H35.3221: ICD-10-CM

## 2023-05-23 PROCEDURE — 67028 INJECTION EYE DRUG: CPT

## 2023-05-23 PROCEDURE — 92134 CPTRZ OPH DX IMG PST SGM RTA: CPT

## 2023-05-23 ASSESSMENT — TONOMETRY: OS_IOP_MMHG: 16

## 2023-07-26 ENCOUNTER — CLINIC PROCEDURE ONLY (OUTPATIENT)
Dept: URBAN - METROPOLITAN AREA CLINIC 26 | Facility: CLINIC | Age: 88
End: 2023-07-26

## 2023-07-26 DIAGNOSIS — H35.3112: ICD-10-CM

## 2023-07-26 DIAGNOSIS — H35.3221: ICD-10-CM

## 2023-07-26 DIAGNOSIS — H35.371: ICD-10-CM

## 2023-07-26 PROCEDURE — 67028 INJECTION EYE DRUG: CPT

## 2023-07-26 PROCEDURE — 92134 CPTRZ OPH DX IMG PST SGM RTA: CPT

## 2023-07-26 ASSESSMENT — TONOMETRY: OS_IOP_MMHG: 11

## 2023-09-19 ENCOUNTER — FOLLOW UP (OUTPATIENT)
Dept: URBAN - METROPOLITAN AREA CLINIC 26 | Facility: CLINIC | Age: 88
End: 2023-09-19

## 2023-09-19 DIAGNOSIS — H35.3112: ICD-10-CM

## 2023-09-19 DIAGNOSIS — H35.371: ICD-10-CM

## 2023-09-19 DIAGNOSIS — H04.123: ICD-10-CM

## 2023-09-19 DIAGNOSIS — H35.3123: ICD-10-CM

## 2023-09-19 DIAGNOSIS — H35.3221: ICD-10-CM

## 2023-09-19 DIAGNOSIS — H40.023: ICD-10-CM

## 2023-09-19 DIAGNOSIS — H43.813: ICD-10-CM

## 2023-09-19 PROCEDURE — 92250 FUNDUS PHOTOGRAPHY W/I&R: CPT

## 2023-09-19 PROCEDURE — 92134 CPTRZ OPH DX IMG PST SGM RTA: CPT

## 2023-09-19 PROCEDURE — 92014 COMPRE OPH EXAM EST PT 1/>: CPT

## 2023-09-19 PROCEDURE — 67028 INJECTION EYE DRUG: CPT

## 2023-09-19 ASSESSMENT — VISUAL ACUITY
OD_PH: 20/30
OS_PH: 20/50+2
OD_SC: 20/40+2
OS_SC: 20/50-2

## 2023-09-19 ASSESSMENT — TONOMETRY
OD_IOP_MMHG: 15
OS_IOP_MMHG: 16

## 2023-10-24 ENCOUNTER — CLINIC PROCEDURE ONLY (OUTPATIENT)
Dept: URBAN - METROPOLITAN AREA CLINIC 26 | Facility: CLINIC | Age: 88
End: 2023-10-24

## 2023-10-24 DIAGNOSIS — H35.3123: ICD-10-CM

## 2023-10-24 DIAGNOSIS — H35.3221: ICD-10-CM

## 2023-10-24 DIAGNOSIS — H35.371: ICD-10-CM

## 2023-10-24 DIAGNOSIS — H35.3112: ICD-10-CM

## 2023-10-24 PROCEDURE — 67028 INJECTION EYE DRUG: CPT

## 2023-10-24 PROCEDURE — 92250 FUNDUS PHOTOGRAPHY W/I&R: CPT

## 2023-10-24 PROCEDURE — J3490IZ IZERVAY 2MG: Mod: JZ

## 2023-11-13 ENCOUNTER — CLINIC PROCEDURE ONLY (OUTPATIENT)
Dept: URBAN - METROPOLITAN AREA CLINIC 26 | Facility: CLINIC | Age: 88
End: 2023-11-13

## 2023-11-13 DIAGNOSIS — H35.371: ICD-10-CM

## 2023-11-13 DIAGNOSIS — H35.3221: ICD-10-CM

## 2023-11-13 DIAGNOSIS — H35.3112: ICD-10-CM

## 2023-11-13 PROCEDURE — 67028 INJECTION EYE DRUG: CPT

## 2023-11-13 PROCEDURE — 92134 CPTRZ OPH DX IMG PST SGM RTA: CPT

## 2023-11-13 ASSESSMENT — TONOMETRY: OS_IOP_MMHG: 16

## 2023-11-28 ENCOUNTER — CLINIC PROCEDURE ONLY (OUTPATIENT)
Dept: URBAN - METROPOLITAN AREA CLINIC 26 | Facility: CLINIC | Age: 88
End: 2023-11-28

## 2023-11-28 DIAGNOSIS — H35.3221: ICD-10-CM

## 2023-11-28 DIAGNOSIS — H35.3123: ICD-10-CM

## 2023-11-28 DIAGNOSIS — H35.3112: ICD-10-CM

## 2023-11-28 DIAGNOSIS — H35.371: ICD-10-CM

## 2023-11-28 PROCEDURE — 92250 FUNDUS PHOTOGRAPHY W/I&R: CPT

## 2023-11-28 PROCEDURE — J3490IZ IZERVAY 2MG

## 2023-11-28 PROCEDURE — 67028 INJECTION EYE DRUG: CPT

## 2023-11-28 ASSESSMENT — TONOMETRY: OS_IOP_MMHG: 11

## 2024-01-04 ENCOUNTER — FOLLOW UP (OUTPATIENT)
Dept: URBAN - METROPOLITAN AREA CLINIC 33 | Facility: CLINIC | Age: 89
End: 2024-01-04

## 2024-01-04 DIAGNOSIS — H02.833: ICD-10-CM

## 2024-01-04 DIAGNOSIS — H43.813: ICD-10-CM

## 2024-01-04 DIAGNOSIS — H35.3112: ICD-10-CM

## 2024-01-04 DIAGNOSIS — H35.3231: ICD-10-CM

## 2024-01-04 DIAGNOSIS — H35.371: ICD-10-CM

## 2024-01-04 DIAGNOSIS — H40.023: ICD-10-CM

## 2024-01-04 DIAGNOSIS — H35.3123: ICD-10-CM

## 2024-01-04 DIAGNOSIS — H04.123: ICD-10-CM

## 2024-01-04 DIAGNOSIS — H02.836: ICD-10-CM

## 2024-01-04 DIAGNOSIS — H53.8: ICD-10-CM

## 2024-01-04 PROCEDURE — 92134 CPTRZ OPH DX IMG PST SGM RTA: CPT

## 2024-01-04 PROCEDURE — 92250 FUNDUS PHOTOGRAPHY W/I&R: CPT

## 2024-01-04 PROCEDURE — 92012 INTRM OPH EXAM EST PATIENT: CPT

## 2024-01-04 ASSESSMENT — TONOMETRY
OD_IOP_MMHG: 14
OS_IOP_MMHG: 15

## 2024-01-04 ASSESSMENT — VISUAL ACUITY
OD_CC: 20/50+2
OS_CC: 20/80

## 2024-01-08 ENCOUNTER — FOLLOW UP (OUTPATIENT)
Dept: URBAN - METROPOLITAN AREA CLINIC 26 | Facility: CLINIC | Age: 89
End: 2024-01-08

## 2024-01-08 VITALS
HEART RATE: 70 BPM | SYSTOLIC BLOOD PRESSURE: 138 MMHG | WEIGHT: 160 LBS | DIASTOLIC BLOOD PRESSURE: 73 MMHG | BODY MASS INDEX: 25.11 KG/M2 | HEIGHT: 67 IN

## 2024-01-08 DIAGNOSIS — H53.8: ICD-10-CM

## 2024-01-08 DIAGNOSIS — H40.023: ICD-10-CM

## 2024-01-08 DIAGNOSIS — H04.123: ICD-10-CM

## 2024-01-08 DIAGNOSIS — H43.813: ICD-10-CM

## 2024-01-08 DIAGNOSIS — H35.3231: ICD-10-CM

## 2024-01-08 DIAGNOSIS — H35.3123: ICD-10-CM

## 2024-01-08 DIAGNOSIS — H35.3112: ICD-10-CM

## 2024-01-08 DIAGNOSIS — H35.371: ICD-10-CM

## 2024-01-08 DIAGNOSIS — H02.836: ICD-10-CM

## 2024-01-08 DIAGNOSIS — H02.833: ICD-10-CM

## 2024-01-08 PROCEDURE — 67028 INJECTION EYE DRUG: CPT

## 2024-01-08 PROCEDURE — 92014 COMPRE OPH EXAM EST PT 1/>: CPT

## 2024-01-08 PROCEDURE — J3490AVA AVASTIN *

## 2024-01-08 PROCEDURE — 92235 FLUORESCEIN ANGRPH MLTIFRAME: CPT

## 2024-01-08 ASSESSMENT — TONOMETRY
OD_IOP_MMHG: 14
OS_IOP_MMHG: 13

## 2024-01-08 ASSESSMENT — VISUAL ACUITY
OS_PH: 20/30-2
OS_CC: 20/40-2
OD_CC: 20/40

## 2024-02-06 ENCOUNTER — CLINIC PROCEDURE ONLY (OUTPATIENT)
Dept: URBAN - METROPOLITAN AREA CLINIC 26 | Facility: CLINIC | Age: 89
End: 2024-02-06

## 2024-02-06 DIAGNOSIS — H35.3231: ICD-10-CM

## 2024-02-06 PROCEDURE — 67028 INJECTION EYE DRUG: CPT

## 2024-02-06 PROCEDURE — 92134 CPTRZ OPH DX IMG PST SGM RTA: CPT

## 2024-02-06 PROCEDURE — J3490AVA AVASTIN *

## 2024-02-06 ASSESSMENT — TONOMETRY: OD_IOP_MMHG: 15

## 2024-03-11 ENCOUNTER — CLINIC PROCEDURE ONLY (OUTPATIENT)
Dept: URBAN - METROPOLITAN AREA CLINIC 26 | Facility: CLINIC | Age: 89
End: 2024-03-11

## 2024-03-11 DIAGNOSIS — H35.3231: ICD-10-CM

## 2024-03-11 PROCEDURE — 92250 FUNDUS PHOTOGRAPHY W/I&R: CPT

## 2024-03-11 PROCEDURE — 67028 INJECTION EYE DRUG: CPT

## 2024-03-11 PROCEDURE — J3490AVA AVASTIN *

## 2024-03-11 ASSESSMENT — TONOMETRY
OS_IOP_MMHG: 12
OD_IOP_MMHG: 16

## 2024-04-09 ENCOUNTER — CLINIC PROCEDURE ONLY (OUTPATIENT)
Dept: URBAN - METROPOLITAN AREA CLINIC 26 | Facility: CLINIC | Age: 89
End: 2024-04-09

## 2024-04-09 DIAGNOSIS — H35.3231: ICD-10-CM

## 2024-04-09 PROCEDURE — J3490AVA AVASTIN *

## 2024-04-09 PROCEDURE — 92134 CPTRZ OPH DX IMG PST SGM RTA: CPT

## 2024-04-09 PROCEDURE — 67028 INJECTION EYE DRUG: CPT

## 2024-04-09 ASSESSMENT — TONOMETRY: OD_IOP_MMHG: 9

## 2024-05-14 ENCOUNTER — CLINIC PROCEDURE ONLY (OUTPATIENT)
Dept: URBAN - METROPOLITAN AREA CLINIC 26 | Facility: CLINIC | Age: 89
End: 2024-05-14

## 2024-05-14 DIAGNOSIS — H35.3231: ICD-10-CM

## 2024-05-14 PROCEDURE — 67028 INJECTION EYE DRUG: CPT

## 2024-05-14 PROCEDURE — 92250 FUNDUS PHOTOGRAPHY W/I&R: CPT | Mod: 59

## 2024-05-14 PROCEDURE — J3490AVA AVASTIN *

## 2024-05-14 PROCEDURE — 92134 CPTRZ OPH DX IMG PST SGM RTA: CPT

## 2024-05-14 ASSESSMENT — TONOMETRY
OS_IOP_MMHG: 16
OD_IOP_MMHG: 14

## 2024-06-11 ENCOUNTER — CLINIC PROCEDURE ONLY (OUTPATIENT)
Dept: URBAN - METROPOLITAN AREA CLINIC 26 | Facility: CLINIC | Age: 89
End: 2024-06-11

## 2024-06-11 DIAGNOSIS — H35.3231: ICD-10-CM

## 2024-06-11 PROCEDURE — 92134 CPTRZ OPH DX IMG PST SGM RTA: CPT

## 2024-06-11 PROCEDURE — J3490AVA AVASTIN *

## 2024-06-11 PROCEDURE — 92250 FUNDUS PHOTOGRAPHY W/I&R: CPT

## 2024-06-11 PROCEDURE — 67028 INJECTION EYE DRUG: CPT

## 2024-06-11 ASSESSMENT — VISUAL ACUITY
OD_PH: 20/40+2
OD_SC: 20/200+1

## 2024-06-11 ASSESSMENT — TONOMETRY: OD_IOP_MMHG: 14

## 2024-07-16 ENCOUNTER — CLINIC PROCEDURE ONLY (OUTPATIENT)
Dept: URBAN - METROPOLITAN AREA CLINIC 26 | Facility: CLINIC | Age: 89
End: 2024-07-16

## 2024-07-16 DIAGNOSIS — H35.3231: ICD-10-CM

## 2024-07-16 PROCEDURE — 92250 FUNDUS PHOTOGRAPHY W/I&R: CPT

## 2024-07-16 PROCEDURE — 92134 CPTRZ OPH DX IMG PST SGM RTA: CPT

## 2024-07-16 PROCEDURE — J3490AVA AVASTIN *

## 2024-07-16 PROCEDURE — 67028 INJECTION EYE DRUG: CPT

## 2024-07-16 ASSESSMENT — TONOMETRY
OS_IOP_MMHG: 12
OD_IOP_MMHG: 12

## 2024-08-13 ENCOUNTER — CLINIC PROCEDURE ONLY (OUTPATIENT)
Dept: URBAN - METROPOLITAN AREA CLINIC 26 | Facility: CLINIC | Age: 89
End: 2024-08-13

## 2024-08-13 DIAGNOSIS — H35.3231: ICD-10-CM

## 2024-08-13 PROCEDURE — 92250 FUNDUS PHOTOGRAPHY W/I&R: CPT | Mod: 59

## 2024-08-13 PROCEDURE — J3490AVA AVASTIN *

## 2024-08-13 PROCEDURE — 92134 CPTRZ OPH DX IMG PST SGM RTA: CPT

## 2024-08-13 PROCEDURE — 67028 INJECTION EYE DRUG: CPT

## 2024-08-13 ASSESSMENT — TONOMETRY: OD_IOP_MMHG: 16

## 2024-09-10 ENCOUNTER — COMPREHENSIVE EXAM (OUTPATIENT)
Dept: URBAN - METROPOLITAN AREA CLINIC 26 | Facility: CLINIC | Age: 89
End: 2024-09-10

## 2024-09-10 VITALS — WEIGHT: 160 LBS | HEIGHT: 66 IN | BODY MASS INDEX: 25.71 KG/M2

## 2024-09-10 DIAGNOSIS — H35.3112: ICD-10-CM

## 2024-09-10 DIAGNOSIS — H35.371: ICD-10-CM

## 2024-09-10 DIAGNOSIS — H40.023: ICD-10-CM

## 2024-09-10 DIAGNOSIS — H35.3123: ICD-10-CM

## 2024-09-10 DIAGNOSIS — H35.3231: ICD-10-CM

## 2024-09-10 DIAGNOSIS — Z96.1: ICD-10-CM

## 2024-09-10 DIAGNOSIS — H04.123: ICD-10-CM

## 2024-09-10 DIAGNOSIS — H53.8: ICD-10-CM

## 2024-09-10 DIAGNOSIS — H02.836: ICD-10-CM

## 2024-09-10 DIAGNOSIS — H02.833: ICD-10-CM

## 2024-09-10 DIAGNOSIS — H43.813: ICD-10-CM

## 2024-09-10 PROCEDURE — 92014 COMPRE OPH EXAM EST PT 1/>: CPT | Mod: 25

## 2024-09-10 PROCEDURE — J3490AVA AVASTIN *

## 2024-09-10 PROCEDURE — 92250 FUNDUS PHOTOGRAPHY W/I&R: CPT | Mod: 59

## 2024-09-10 PROCEDURE — 67028 INJECTION EYE DRUG: CPT

## 2024-09-10 PROCEDURE — 92134 CPTRZ OPH DX IMG PST SGM RTA: CPT

## 2024-10-22 ENCOUNTER — CLINIC PROCEDURE ONLY (OUTPATIENT)
Dept: URBAN - METROPOLITAN AREA CLINIC 26 | Facility: CLINIC | Age: 89
End: 2024-10-22

## 2024-10-22 DIAGNOSIS — H35.3231: ICD-10-CM

## 2024-10-22 PROCEDURE — 92134 CPTRZ OPH DX IMG PST SGM RTA: CPT

## 2024-10-22 PROCEDURE — 92250 FUNDUS PHOTOGRAPHY W/I&R: CPT | Mod: 59

## 2024-10-22 PROCEDURE — J3490AVA AVASTIN *

## 2024-10-22 PROCEDURE — 67028 INJECTION EYE DRUG: CPT

## 2024-12-10 ENCOUNTER — CLINIC PROCEDURE ONLY (OUTPATIENT)
Age: 89
End: 2024-12-10

## 2024-12-10 DIAGNOSIS — H35.3231: ICD-10-CM

## 2024-12-10 PROCEDURE — 67028 INJECTION EYE DRUG: CPT

## 2024-12-10 PROCEDURE — 92134 CPTRZ OPH DX IMG PST SGM RTA: CPT

## 2024-12-10 PROCEDURE — 92250 FUNDUS PHOTOGRAPHY W/I&R: CPT | Mod: 59

## 2024-12-10 PROCEDURE — J3490AVA AVASTIN *

## 2025-02-04 ENCOUNTER — CLINIC PROCEDURE ONLY (OUTPATIENT)
Age: OVER 89
End: 2025-02-04

## 2025-02-04 DIAGNOSIS — H35.3231: ICD-10-CM

## 2025-02-04 PROCEDURE — 92250 FUNDUS PHOTOGRAPHY W/I&R: CPT | Mod: 59

## 2025-02-04 PROCEDURE — J3490AVA AVASTIN *

## 2025-02-04 PROCEDURE — 92134 CPTRZ OPH DX IMG PST SGM RTA: CPT

## 2025-02-04 PROCEDURE — 67028 INJECTION EYE DRUG: CPT

## 2025-04-01 ENCOUNTER — CLINIC PROCEDURE ONLY (OUTPATIENT)
Age: OVER 89
End: 2025-04-01

## 2025-04-01 DIAGNOSIS — H35.3231: ICD-10-CM

## 2025-04-01 PROCEDURE — 92250 FUNDUS PHOTOGRAPHY W/I&R: CPT | Mod: 59

## 2025-04-01 PROCEDURE — 67028 INJECTION EYE DRUG: CPT

## 2025-04-01 PROCEDURE — J3490AVA AVASTIN *

## 2025-04-01 PROCEDURE — 92134 CPTRZ OPH DX IMG PST SGM RTA: CPT

## 2025-05-20 ENCOUNTER — CLINIC PROCEDURE ONLY (OUTPATIENT)
Age: OVER 89
End: 2025-05-20

## 2025-05-20 DIAGNOSIS — H35.3231: ICD-10-CM

## 2025-05-20 PROCEDURE — 67028 INJECTION EYE DRUG: CPT

## 2025-05-20 PROCEDURE — 92250 FUNDUS PHOTOGRAPHY W/I&R: CPT | Mod: 59

## 2025-05-20 PROCEDURE — 92134 CPTRZ OPH DX IMG PST SGM RTA: CPT

## 2025-05-20 PROCEDURE — J3490AVA AVASTIN *

## 2025-07-29 ENCOUNTER — FOLLOW UP (OUTPATIENT)
Age: OVER 89
End: 2025-07-29

## 2025-07-29 VITALS — BODY MASS INDEX: 24.59 KG/M2 | WEIGHT: 153 LBS | HEIGHT: 66 IN

## 2025-07-29 DIAGNOSIS — H53.8: ICD-10-CM

## 2025-07-29 DIAGNOSIS — H02.836: ICD-10-CM

## 2025-07-29 DIAGNOSIS — Z96.1: ICD-10-CM

## 2025-07-29 DIAGNOSIS — H35.3231: ICD-10-CM

## 2025-07-29 DIAGNOSIS — H43.813: ICD-10-CM

## 2025-07-29 DIAGNOSIS — H04.123: ICD-10-CM

## 2025-07-29 DIAGNOSIS — H35.371: ICD-10-CM

## 2025-07-29 DIAGNOSIS — H35.3112: ICD-10-CM

## 2025-07-29 DIAGNOSIS — H35.3123: ICD-10-CM

## 2025-07-29 DIAGNOSIS — H40.023: ICD-10-CM

## 2025-07-29 DIAGNOSIS — H02.833: ICD-10-CM

## 2025-07-29 PROCEDURE — J3490AVAJZ AVASTIN, NO DRUG WASTE

## 2025-07-29 PROCEDURE — 92134 CPTRZ OPH DX IMG PST SGM RTA: CPT

## 2025-07-29 PROCEDURE — 92014 COMPRE OPH EXAM EST PT 1/>: CPT | Mod: 25

## 2025-07-29 PROCEDURE — 92250 FUNDUS PHOTOGRAPHY W/I&R: CPT | Mod: 59

## 2025-07-29 PROCEDURE — 67028 INJECTION EYE DRUG: CPT
